# Patient Record
Sex: MALE | Race: WHITE | NOT HISPANIC OR LATINO | Employment: FULL TIME | URBAN - METROPOLITAN AREA
[De-identification: names, ages, dates, MRNs, and addresses within clinical notes are randomized per-mention and may not be internally consistent; named-entity substitution may affect disease eponyms.]

---

## 2017-03-03 ENCOUNTER — HOSPITAL ENCOUNTER (EMERGENCY)
Facility: HOSPITAL | Age: 52
Discharge: HOME | End: 2017-03-03
Admitting: NURSE PRACTITIONER

## 2017-03-03 ENCOUNTER — APPOINTMENT (OUTPATIENT)
Dept: GENERAL RADIOLOGY | Facility: HOSPITAL | Age: 52
End: 2017-03-03

## 2017-03-03 VITALS
BODY MASS INDEX: 37.3 KG/M2 | WEIGHT: 300 LBS | TEMPERATURE: 97.6 F | HEIGHT: 75 IN | RESPIRATION RATE: 18 BRPM | OXYGEN SATURATION: 98 % | HEART RATE: 76 BPM | DIASTOLIC BLOOD PRESSURE: 84 MMHG | SYSTOLIC BLOOD PRESSURE: 130 MMHG

## 2017-03-03 DIAGNOSIS — M54.41 ACUTE RIGHT-SIDED LOW BACK PAIN WITH RIGHT-SIDED SCIATICA: Primary | ICD-10-CM

## 2017-03-03 PROCEDURE — 63710000001 PREDNISONE PER 5 MG: Performed by: NURSE PRACTITIONER

## 2017-03-03 PROCEDURE — 99284 EMERGENCY DEPT VISIT MOD MDM: CPT | Performed by: NURSE PRACTITIONER

## 2017-03-03 PROCEDURE — 99283 EMERGENCY DEPT VISIT LOW MDM: CPT

## 2017-03-03 PROCEDURE — 72100 X-RAY EXAM L-S SPINE 2/3 VWS: CPT

## 2017-03-03 RX ORDER — CYCLOBENZAPRINE HCL 10 MG
10 TABLET ORAL 3 TIMES DAILY PRN
Qty: 30 TABLET | Refills: 0 | Status: SHIPPED | OUTPATIENT
Start: 2017-03-03 | End: 2017-03-13

## 2017-03-03 RX ORDER — PREDNISONE 20 MG/1
20 TABLET ORAL 3 TIMES DAILY
Qty: 12 TABLET | Refills: 0 | Status: SHIPPED | OUTPATIENT
Start: 2017-03-03 | End: 2017-03-07

## 2017-03-03 RX ORDER — HYDROCODONE BITARTRATE AND ACETAMINOPHEN 5; 325 MG/1; MG/1
1 TABLET ORAL ONCE
Status: COMPLETED | OUTPATIENT
Start: 2017-03-03 | End: 2017-03-03

## 2017-03-03 RX ORDER — HYDROCODONE BITARTRATE AND ACETAMINOPHEN 5; 325 MG/1; MG/1
TABLET ORAL
Status: COMPLETED
Start: 2017-03-03 | End: 2017-03-03

## 2017-03-03 RX ORDER — CYCLOBENZAPRINE HCL 10 MG
10 TABLET ORAL ONCE
Status: COMPLETED | OUTPATIENT
Start: 2017-03-03 | End: 2017-03-03

## 2017-03-03 RX ORDER — PREDNISONE 20 MG/1
60 TABLET ORAL ONCE
Status: COMPLETED | OUTPATIENT
Start: 2017-03-03 | End: 2017-03-03

## 2017-03-03 RX ORDER — HYDROCODONE BITARTRATE AND ACETAMINOPHEN 5; 325 MG/1; MG/1
1 TABLET ORAL EVERY 4 HOURS PRN
Qty: 20 TABLET | Refills: 0 | Status: SHIPPED | OUTPATIENT
Start: 2017-03-03

## 2017-03-03 RX ADMIN — HYDROCODONE BITARTRATE AND ACETAMINOPHEN 1 TABLET: 5; 325 TABLET ORAL at 19:10

## 2017-03-03 RX ADMIN — PREDNISONE 60 MG: 20 TABLET ORAL at 18:47

## 2017-03-03 RX ADMIN — CYCLOBENZAPRINE HYDROCHLORIDE 10 MG: 10 TABLET, FILM COATED ORAL at 19:07

## 2017-03-03 NOTE — ED PROVIDER NOTES
Subjective   Patient is a 52 y.o. male presenting with back pain.   History provided by:  Patient   used: No    Back Pain   Location:  Sacro-iliac joint  Quality: throbbing.  Radiates to:  R thigh (Right inguinal canal)  Pain severity:  Moderate  Pain is:  Same all the time  Onset quality:  Sudden  Duration:  2 days  Timing:  Constant  Progression:  Unchanged  Chronicity:  Recurrent  Context: not emotional stress, not falling, not jumping from heights, not lifting heavy objects, not MCA, not MVA, not pedestrian accident, not physical stress, not recent illness, not recent injury and not twisting    Relieved by:  Lying down  Worsened by:  Bending and sitting  Ineffective treatments:  None tried  Associated symptoms: no abdominal swelling, no bladder incontinence, no bowel incontinence, no chest pain, no dysuria, no fever, no headaches, no leg pain, no paresthesias, no pelvic pain, no perianal numbness, no weakness and no weight loss    Risk factors: obesity    Risk factors: no hx of osteoporosis        Review of Systems   Constitutional: Negative for activity change, chills, fatigue, fever and weight loss.   HENT: Negative for congestion, sinus pressure, sore throat and trouble swallowing.    Respiratory: Negative for chest tightness, shortness of breath, wheezing and stridor.    Cardiovascular: Negative for chest pain.   Gastrointestinal: Negative for bowel incontinence, constipation, diarrhea, nausea and vomiting.   Genitourinary: Negative for bladder incontinence, dysuria, frequency, hematuria and pelvic pain.   Musculoskeletal: Positive for back pain and myalgias.   Skin: Negative for color change and rash.   Neurological: Negative for weakness, headaches and paresthesias.       Past Medical History   Diagnosis Date   • Injury of back        No Known Allergies    Past Surgical History   Procedure Laterality Date   • Nasal septum surgery     • Tonsillectomy         History reviewed. No  pertinent family history.    Social History     Social History   • Marital status:      Spouse name: N/A   • Number of children: N/A   • Years of education: N/A     Social History Main Topics   • Smoking status: Never Smoker   • Smokeless tobacco: None   • Alcohol use None      Comment: Occasional   • Drug use: No   • Sexual activity: Not Asked     Other Topics Concern   • None     Social History Narrative   • None           Objective   Physical Exam   Constitutional: He is oriented to person, place, and time. He appears well-developed and well-nourished. No distress.   HENT:   Head: Normocephalic and atraumatic.   Right Ear: External ear normal.   Left Ear: External ear normal.   Nose: Nose normal.   Mouth/Throat: Oropharynx is clear and moist.   Eyes: Conjunctivae are normal. Pupils are equal, round, and reactive to light.   Neck: Neck supple. No tracheal deviation present. No thyromegaly present.   Cardiovascular: Normal rate, regular rhythm and normal heart sounds.    Pulmonary/Chest: Effort normal and breath sounds normal. No respiratory distress. He has no wheezes. He has no rales.   Abdominal: Soft. Bowel sounds are normal. He exhibits no distension. There is no tenderness. There is no guarding.   Musculoskeletal: He exhibits no edema or deformity.        Arms:  Lymphadenopathy:     He has no cervical adenopathy.   Neurological: He is alert and oriented to person, place, and time.   Skin: Skin is warm and dry. No rash noted. He is not diaphoretic. No erythema.   Psychiatric: He has a normal mood and affect. Judgment and thought content normal.   Nursing note and vitals reviewed.      Procedures         ED Course  ED Course   Comment By Time   Patient presents the ED complaining of right low back pain.  This pain is different from a previous back pain that he's had.  Pain in the iliosacral area and radiates anteriorly down into his right inguinal area and his right thigh.  Onset of pain ×2 days worse  today. MADIE Farnsworth 03/03 1835   X-ray of the lumbar spines negative.  Films were reviewed with MADIE Lopez 03/03 1931   Given Norco Flexeril and prednisone.  20 minutes after receiving the medications his pain is gone from an 8 down to 4 MADIE Farnsworth 03/03 1940                  MDM    Final diagnoses:   Acute right-sided low back pain with right-sided sciatica            MADIE Farnsworth  03/03/17 1940

## 2017-03-17 ENCOUNTER — HOSPITAL ENCOUNTER (OUTPATIENT)
Dept: GENERAL RADIOLOGY | Facility: HOSPITAL | Age: 52
Discharge: HOME | End: 2017-03-17
Attending: ORTHOPAEDIC SURGERY | Admitting: ORTHOPAEDIC SURGERY

## 2017-03-17 ENCOUNTER — TRANSCRIBE ORDERS (OUTPATIENT)
Dept: ADMINISTRATIVE | Facility: HOSPITAL | Age: 52
End: 2017-03-17

## 2017-03-17 DIAGNOSIS — M25.551 PAIN OF RIGHT HIP JOINT: Primary | ICD-10-CM

## 2017-03-17 DIAGNOSIS — M54.9 RIGHT-SIDED BACK PAIN, UNSPECIFIED BACK LOCATION, UNSPECIFIED CHRONICITY: ICD-10-CM

## 2017-03-17 DIAGNOSIS — M25.551 PAIN OF RIGHT HIP JOINT: ICD-10-CM

## 2017-03-17 PROCEDURE — 73502 X-RAY EXAM HIP UNI 2-3 VIEWS: CPT

## 2017-08-12 ENCOUNTER — OFFICE VISIT (OUTPATIENT)
Dept: RETAIL CLINIC | Facility: CLINIC | Age: 52
End: 2017-08-12

## 2017-08-12 VITALS
OXYGEN SATURATION: 95 % | TEMPERATURE: 96.8 F | HEART RATE: 92 BPM | DIASTOLIC BLOOD PRESSURE: 72 MMHG | SYSTOLIC BLOOD PRESSURE: 104 MMHG | RESPIRATION RATE: 18 BRPM

## 2017-08-12 DIAGNOSIS — L23.7 ALLERGIC CONTACT DERMATITIS DUE TO PLANTS, EXCEPT FOOD: Primary | ICD-10-CM

## 2017-08-12 PROCEDURE — 99213 OFFICE O/P EST LOW 20 MIN: CPT | Performed by: NURSE PRACTITIONER

## 2017-08-12 PROCEDURE — 96372 THER/PROPH/DIAG INJ SC/IM: CPT | Performed by: NURSE PRACTITIONER

## 2017-08-12 RX ORDER — TRIAMCINOLONE ACETONIDE 1 MG/G
CREAM TOPICAL 2 TIMES DAILY
Qty: 85 G | Refills: 0 | Status: SHIPPED | OUTPATIENT
Start: 2017-08-12 | End: 2017-08-22

## 2017-08-12 RX ORDER — TRIAMCINOLONE ACETONIDE 40 MG/ML
40 INJECTION, SUSPENSION INTRA-ARTICULAR; INTRAMUSCULAR ONCE
Status: COMPLETED | OUTPATIENT
Start: 2017-08-12 | End: 2017-08-12

## 2017-08-12 RX ORDER — NAPROXEN 250 MG/1
TABLET ORAL
COMMUNITY

## 2017-08-12 RX ADMIN — TRIAMCINOLONE ACETONIDE 40 MG: 40 INJECTION, SUSPENSION INTRA-ARTICULAR; INTRAMUSCULAR at 16:40

## 2017-08-12 NOTE — PROGRESS NOTES
[facility] [place]    CC:   Chief Complaint   Patient presents with   • Rash     HPI   52 YOM presents c/o 1 day of itching rash with known allergy to poison ivy. Now with rash over whole body.   No recent sickness/fever/chills or other concerns.   Using hydrocortisone lotion with some relief.   No rashes in household    Review of Systems: Please see the above history of present illness for pertinent positives and negatives. The remainder of the patient's systems have been reviewed and are negative.     Past Medical History:   Diagnosis Date   • Injury of back        Past Surgical History:   Procedure Laterality Date   • NASAL SEPTUM SURGERY     • TONSILLECTOMY         Social History   Substance Use Topics   • Smoking status: Never Smoker   • Smokeless tobacco: None   • Alcohol use Yes      Comment: Occasional         Current Outpatient Prescriptions:   •  HYDROcodone-acetaminophen (NORCO) 5-325 MG per tablet, Take 1 tablet by mouth Every 4 (Four) Hours As Needed for moderate pain (4-6) for up to 20 doses., Disp: 20 tablet, Rfl: 0  •  naproxen (NAPROSYN) 250 MG tablet, Take  by mouth., Disp: , Rfl:   •  triamcinolone (KENALOG) 0.1 % cream, Apply  topically 2 (Two) Times a Day for 10 days., Disp: 85 g, Rfl: 0  No current facility-administered medications for this visit.     No Known Allergies    OBJECTIVE:    /72  Pulse 92  Temp 96.8 °F (36 °C) (Oral)   Resp 18  SpO2 95%    Lab Results (last 24 hours)     ** No results found for the last 24 hours. **          /72  Pulse 92  Temp 96.8 °F (36 °C) (Oral)   Resp 18  SpO2 95%    General Appearance:    Alert, cooperative, no distress, appears stated age   Head:    Normocephalic, without obvious abnormality, atraumatic   Eyes:    PERRL, conjunctiva/corneas clear, EOM's intact, fundi     benign, both eyes   Ears:    Normal TM's and external ear canals, both ears   Nose:   Nares normal, septum midline, mucosa normal, no drainage     or sinus tenderness    Throat:   Lips, mucosa, and tongue normal; teeth and gums normal   Neck:   Supple, symmetrical, trachea midline, no adenopathy;     thyroid:  no enlargement/tenderness/nodules; no carotid    bruit or JVD   Back:     Symmetric, no curvature, ROM normal, no CVA tenderness   Lungs:     Clear to auscultation bilaterally, respirations unlabored   Chest Wall:    No tenderness or deformity    Heart:    Regular rate and rhythm, S1 and S2 normal, no murmur, rub    or gallop        Skin: generalized papular/vesicular/erythematous rash over trunk/arms                                    ASSESSMENT/PLAN    1. Allergic contact dermatitis due to plants, except food    - triamcinolone (KENALOG) 0.1 % cream; Apply  topically 2 (Two) Times a Day for 10 days.  Dispense: 85 g; Refill: 0  - triamcinolone acetonide (KENALOG-40) injection 40 mg; Inject 1 mL into the shoulder, thigh, or buttocks 1 (One) Time.    You are diagnosed today with poison ivy/ contact dermatitis. A steroid cream was prescribed for you today. Take as directed. Do not take additional NSAIDs while taking steroids. These include Ibuprofen, Motrin, Advil, Naproxen, or Aleve. Tylenol is okay to take. Steroids can cause increased irritability, increased energy, appetite and disrupted sleep while taking. It also may cause sun sensitivity, so avoid prolonged sun exposure, sunscreen if outdoors. Zyrtec or Benadryl may also help with itching. Calamine lotion or oatmeal baths also help ease symptoms. Washing skin with Zanfel, an over the counter medicated wash, may help relieve itching/rash by removing the oil from your skin that is causing the rash. Follow up with your PCP for persistent or wosening symptoms.